# Patient Record
Sex: FEMALE | Race: WHITE | NOT HISPANIC OR LATINO | Employment: FULL TIME | URBAN - METROPOLITAN AREA
[De-identification: names, ages, dates, MRNs, and addresses within clinical notes are randomized per-mention and may not be internally consistent; named-entity substitution may affect disease eponyms.]

---

## 2021-06-21 ENCOUNTER — OFFICE VISIT (OUTPATIENT)
Dept: URGENT CARE | Facility: CLINIC | Age: 41
End: 2021-06-21
Payer: COMMERCIAL

## 2021-06-21 VITALS
SYSTOLIC BLOOD PRESSURE: 110 MMHG | DIASTOLIC BLOOD PRESSURE: 62 MMHG | RESPIRATION RATE: 18 BRPM | HEIGHT: 61 IN | HEART RATE: 72 BPM | WEIGHT: 195 LBS | OXYGEN SATURATION: 98 % | TEMPERATURE: 97.2 F | BODY MASS INDEX: 36.82 KG/M2

## 2021-06-21 DIAGNOSIS — J01.10 ACUTE NON-RECURRENT FRONTAL SINUSITIS: Primary | ICD-10-CM

## 2021-06-21 PROCEDURE — 99213 OFFICE O/P EST LOW 20 MIN: CPT | Performed by: NURSE PRACTITIONER

## 2021-06-21 RX ORDER — DULOXETIN HYDROCHLORIDE 30 MG/1
90 CAPSULE, DELAYED RELEASE ORAL DAILY
COMMUNITY
Start: 2021-06-11

## 2021-06-21 RX ORDER — LAMOTRIGINE 25 MG/1
75 TABLET ORAL DAILY
COMMUNITY
Start: 2021-06-05

## 2021-06-21 RX ORDER — CEFDINIR 300 MG/1
300 CAPSULE ORAL EVERY 12 HOURS SCHEDULED
Qty: 14 CAPSULE | Refills: 0 | Status: SHIPPED | OUTPATIENT
Start: 2021-06-21 | End: 2021-06-28

## 2021-06-21 RX ORDER — PROPRANOLOL HCL 60 MG
60 CAPSULE, EXTENDED RELEASE 24HR ORAL
COMMUNITY
Start: 2021-05-17

## 2021-06-21 RX ORDER — ACYCLOVIR 50 MG/G
OINTMENT TOPICAL
COMMUNITY
Start: 2021-03-23

## 2021-06-21 RX ORDER — LITHIUM CARBONATE 300 MG/1
600 CAPSULE ORAL DAILY
COMMUNITY
Start: 2021-04-26

## 2021-06-21 NOTE — PROGRESS NOTES
[facility]        NAME: Mekhi Tamayo is a 39 y o  female  : 1980    MRN: [id number]  DATE: 2021  TIME: 6:28 PM    Assessment and Plan   Acute non-recurrent frontal sinusitis [J01 10]  1  Acute non-recurrent frontal sinusitis  cefdinir (OMNICEF) 300 mg capsule   start course of cefdinir for sinusitis   Flying back to [place] in 7 days -  Discussed at home symptom relief measures in addition to antibiotics   Pt in agreement with plan of care      Patient Instructions     Follow up with PCP in 3-5 days  Proceed to  ER if symptoms worsen  Chief Complaint     Chief Complaint   Patient presents with   [place] Like Symptoms     Patient with c/o sore throat, PND, cough and yellow nasal discharge since the          History of Present Illness   Mekhi Tamayo presents to the clinic c/o    Patient with c/o sore throat, PND, cough and yellow nasal discharge since the   States on  flew here from [place]  No known sick contacts   Noted nasal drainage changing colors and the taste is bad  No fevers, body aches  Vaccinated against covid  Here visiting brother who was recently diagnosed with colon cancer  Review of Systems   Review of Systems   All other systems reviewed and are negative          Current Medications     Long-Term Medications   Medication Sig Dispense Refill    DULoxetine (CYMBALTA) 30 mg delayed release capsule 90 mg daily       lamoTRIgine (LaMICtal) 25 mg tablet 75 mg daily       lithium carbonate 300 mg capsule 600 mg daily       propranolol (INDERAL LA) 60 mg 24 hr capsule 60 mg       acyclovir (ZOVIRAX) 5 % ointment  (Patient not taking: Reported on 2021)         Current Allergies     Allergies as of 2021 - Reviewed 2021   Allergen Reaction Noted    Augmentin [amoxicillin-pot clavulanate] Hives 2021            The following portions of the patient's history were reviewed and updated as appropriate: allergies, current medications, past family history, past medical history, past social history, past surgical history and problem list     Objective   /62   Pulse 72   Temp (!) 97 2 °F (36 2 °C) (Tympanic)   Resp 18   Ht 5' 1" (1 549 m)   Wt 88 5 kg (195 lb)   LMP 06/13/2021   SpO2 98%   BMI 36 84 kg/m²        Physical Exam     Physical Exam  Vitals and nursing note reviewed  Constitutional:       Appearance: Normal appearance  She is well-developed  HENT:      Head: Normocephalic and atraumatic  Nose: Mucosal edema and congestion present  Right Sinus: Maxillary sinus tenderness and frontal sinus tenderness present  Left Sinus: Maxillary sinus tenderness and frontal sinus tenderness present  Mouth/Throat:      Pharynx: Oropharynx is clear  Eyes:      General: Lids are normal       Conjunctiva/sclera: Conjunctivae normal    Cardiovascular:      Rate and Rhythm: Normal rate and regular rhythm  Heart sounds: Normal heart sounds, S1 normal and S2 normal    Pulmonary:      Effort: Pulmonary effort is normal       Breath sounds: Normal breath sounds  Skin:     General: Skin is warm and dry  Neurological:      Mental Status: She is alert and oriented to person, place, and time  Psychiatric:         Speech: Speech normal          Behavior: Behavior normal  Behavior is cooperative  Thought Content:  Thought content normal          Judgment: Judgment normal